# Patient Record
Sex: FEMALE | Race: WHITE | NOT HISPANIC OR LATINO | URBAN - METROPOLITAN AREA
[De-identification: names, ages, dates, MRNs, and addresses within clinical notes are randomized per-mention and may not be internally consistent; named-entity substitution may affect disease eponyms.]

---

## 2019-03-18 ENCOUNTER — APPOINTMENT (RX ONLY)
Dept: URBAN - METROPOLITAN AREA CLINIC 157 | Facility: CLINIC | Age: 69
Setting detail: DERMATOLOGY
End: 2019-03-18

## 2019-03-18 VITALS
DIASTOLIC BLOOD PRESSURE: 68 MMHG | HEART RATE: 82 BPM | RESPIRATION RATE: 15 BRPM | WEIGHT: 196 LBS | SYSTOLIC BLOOD PRESSURE: 129 MMHG

## 2019-03-18 DIAGNOSIS — L81.4 OTHER MELANIN HYPERPIGMENTATION: ICD-10-CM

## 2019-03-18 DIAGNOSIS — L72.0 EPIDERMAL CYST: ICD-10-CM

## 2019-03-18 DIAGNOSIS — Z85.820 PERSONAL HISTORY OF MALIGNANT MELANOMA OF SKIN: ICD-10-CM

## 2019-03-18 PROCEDURE — ? COUNSELING

## 2019-03-18 PROCEDURE — ? OTHER

## 2019-03-18 PROCEDURE — ? TREATMENT REGIMEN

## 2019-03-18 PROCEDURE — 99214 OFFICE O/P EST MOD 30 MIN: CPT | Mod: 25

## 2019-03-18 PROCEDURE — ? INCISION AND DRAINAGE

## 2019-03-18 PROCEDURE — 10060 I&D ABSCESS SIMPLE/SINGLE: CPT

## 2019-03-18 ASSESSMENT — LOCATION ZONE DERM
LOCATION ZONE: TRUNK
LOCATION ZONE: ARM
LOCATION ZONE: NOSE

## 2019-03-18 ASSESSMENT — LOCATION DETAILED DESCRIPTION DERM
LOCATION DETAILED: RIGHT ANTERIOR PROXIMAL UPPER ARM
LOCATION DETAILED: LEFT SUPERIOR UPPER BACK
LOCATION DETAILED: NASAL ROOT

## 2019-03-18 ASSESSMENT — LOCATION SIMPLE DESCRIPTION DERM
LOCATION SIMPLE: RIGHT UPPER ARM
LOCATION SIMPLE: NOSE
LOCATION SIMPLE: LEFT UPPER BACK

## 2019-03-18 NOTE — PROCEDURE: INCISION AND DRAINAGE
Suture Text: The incision was partially closed with
Curette: Yes
Drainage Type?: cyst-like
Dressing: brown paper tape
Anesthesia Type: 2% lidocaine with epinephrine
Include Sutures?: No
Wound Care: Petrolatum
Curette Text (Optional): After the contents were expressed a curette was used to partially remove the cyst wall.
Size Of Lesion In Cm (Optional But May Be Required For Some Insurances): 1.5
Post-Care Instructions: I reviewed with the patient in detail post-care instructions. Patient should keep wound covered and call the office should any redness, pain, swelling or worsening occur.
Lesion Type: Cyst
Preparation Text: The area was prepped in the usual clean fashion.
Epidermal Sutures: 4-0 Ethilon
Detail Level: Detailed
Anesthesia Volume In Cc: 0.5
Method: 11 blade
Drainage Amount?: minimal
Consent was obtained and risks were reviewed including but not limited to delayed wound healing, infection, need for multiple I and D's, and pain.
Epidermal Closure: simple interrupted

## 2019-03-18 NOTE — PROCEDURE: OTHER
Detail Level: Detailed
Other (Free Text): Excise if recurs
Note Text (......Xxx Chief Complaint.): This diagnosis correlates with the